# Patient Record
Sex: FEMALE | Race: WHITE | NOT HISPANIC OR LATINO | Employment: FULL TIME | URBAN - METROPOLITAN AREA
[De-identification: names, ages, dates, MRNs, and addresses within clinical notes are randomized per-mention and may not be internally consistent; named-entity substitution may affect disease eponyms.]

---

## 2023-09-29 LAB
ALANINE AMINOTRANSFERASE (SGPT) (U/L) IN SER/PLAS: 15 U/L (ref 7–45)
ALBUMIN (G/DL) IN SER/PLAS: 4.2 G/DL (ref 3.4–5)
ALKALINE PHOSPHATASE (U/L) IN SER/PLAS: 113 U/L (ref 33–136)
ANION GAP IN SER/PLAS: 13 MMOL/L (ref 10–20)
ASPARTATE AMINOTRANSFERASE (SGOT) (U/L) IN SER/PLAS: 15 U/L (ref 9–39)
BILIRUBIN TOTAL (MG/DL) IN SER/PLAS: 0.3 MG/DL (ref 0–1.2)
CALCIDIOL (25 OH VITAMIN D3) (NG/ML) IN SER/PLAS: 53 NG/ML
CALCIUM (MG/DL) IN SER/PLAS: 9.9 MG/DL (ref 8.6–10.3)
CARBON DIOXIDE, TOTAL (MMOL/L) IN SER/PLAS: 22 MMOL/L (ref 21–32)
CHLORIDE (MMOL/L) IN SER/PLAS: 105 MMOL/L (ref 98–107)
CHOLESTEROL (MG/DL) IN SER/PLAS: 154 MG/DL (ref 0–199)
CHOLESTEROL IN HDL (MG/DL) IN SER/PLAS: 32.4 MG/DL
CHOLESTEROL/HDL RATIO: 4.8
COBALAMIN (VITAMIN B12) (PG/ML) IN SER/PLAS: 683 PG/ML (ref 211–911)
CREATININE (MG/DL) IN SER/PLAS: 0.64 MG/DL (ref 0.5–1.05)
ESTIMATED AVERAGE GLUCOSE FOR HBA1C: 169 MG/DL
GFR FEMALE: >90 ML/MIN/1.73M2
GLUCOSE (MG/DL) IN SER/PLAS: 135 MG/DL (ref 74–99)
HEMOGLOBIN A1C/HEMOGLOBIN TOTAL IN BLOOD: 7.5 %
LDL: 58 MG/DL (ref 0–99)
NON HDL CHOLESTEROL: 122 MG/DL
POTASSIUM (MMOL/L) IN SER/PLAS: 4.2 MMOL/L (ref 3.5–5.3)
PROTEIN TOTAL: 7 G/DL (ref 6.4–8.2)
SODIUM (MMOL/L) IN SER/PLAS: 136 MMOL/L (ref 136–145)
THYROTROPIN (MIU/L) IN SER/PLAS BY DETECTION LIMIT <= 0.05 MIU/L: 2.16 MIU/L (ref 0.44–3.98)
TRIGLYCERIDE (MG/DL) IN SER/PLAS: 317 MG/DL (ref 0–149)
UREA NITROGEN (MG/DL) IN SER/PLAS: 28 MG/DL (ref 6–23)
VLDL: 63 MG/DL (ref 0–40)

## 2023-10-13 ENCOUNTER — OFFICE VISIT (OUTPATIENT)
Dept: PRIMARY CARE | Facility: CLINIC | Age: 63
End: 2023-10-13
Payer: COMMERCIAL

## 2023-10-13 VITALS
WEIGHT: 160 LBS | HEIGHT: 62 IN | SYSTOLIC BLOOD PRESSURE: 152 MMHG | HEART RATE: 70 BPM | BODY MASS INDEX: 29.44 KG/M2 | TEMPERATURE: 97.9 F | DIASTOLIC BLOOD PRESSURE: 90 MMHG

## 2023-10-13 DIAGNOSIS — I10 PRIMARY HYPERTENSION: Primary | ICD-10-CM

## 2023-10-13 DIAGNOSIS — F41.9 ANXIETY DISORDER, UNSPECIFIED TYPE: ICD-10-CM

## 2023-10-13 DIAGNOSIS — E78.5 HYPERLIPIDEMIA, UNSPECIFIED HYPERLIPIDEMIA TYPE: ICD-10-CM

## 2023-10-13 DIAGNOSIS — E11.9 TYPE 2 DIABETES MELLITUS WITHOUT COMPLICATION, WITHOUT LONG-TERM CURRENT USE OF INSULIN (MULTI): ICD-10-CM

## 2023-10-13 PROBLEM — E53.8 VITAMIN B12 DEFICIENCY: Status: ACTIVE | Noted: 2023-10-13

## 2023-10-13 PROBLEM — M20.10 HALLUX VALGUS: Status: ACTIVE | Noted: 2023-10-13

## 2023-10-13 PROBLEM — E66.9 OBESITY (BMI 30.0-34.9): Status: ACTIVE | Noted: 2023-10-13

## 2023-10-13 PROBLEM — E55.9 VITAMIN D DEFICIENCY: Status: ACTIVE | Noted: 2023-10-13

## 2023-10-13 PROBLEM — D17.9 LIPOMA: Status: ACTIVE | Noted: 2023-10-13

## 2023-10-13 PROBLEM — E66.811 OBESITY (BMI 30.0-34.9): Status: ACTIVE | Noted: 2023-10-13

## 2023-10-13 PROCEDURE — 3077F SYST BP >= 140 MM HG: CPT | Performed by: FAMILY MEDICINE

## 2023-10-13 PROCEDURE — 99214 OFFICE O/P EST MOD 30 MIN: CPT | Performed by: FAMILY MEDICINE

## 2023-10-13 PROCEDURE — 3080F DIAST BP >= 90 MM HG: CPT | Performed by: FAMILY MEDICINE

## 2023-10-13 PROCEDURE — 1036F TOBACCO NON-USER: CPT | Performed by: FAMILY MEDICINE

## 2023-10-13 PROCEDURE — 3051F HG A1C>EQUAL 7.0%<8.0%: CPT | Performed by: FAMILY MEDICINE

## 2023-10-13 RX ORDER — BLOOD SUGAR DIAGNOSTIC
STRIP MISCELLANEOUS
COMMUNITY
Start: 2014-05-30

## 2023-10-13 RX ORDER — ATORVASTATIN CALCIUM 40 MG/1
40 TABLET, FILM COATED ORAL DAILY
Qty: 90 TABLET | Refills: 0 | Status: SHIPPED | OUTPATIENT
Start: 2023-10-13 | End: 2024-02-15

## 2023-10-13 RX ORDER — ERGOCALCIFEROL 1.25 MG/1
1 CAPSULE ORAL
COMMUNITY
Start: 2021-12-09 | End: 2024-02-29 | Stop reason: WASHOUT

## 2023-10-13 RX ORDER — LANOLIN ALCOHOL/MO/W.PET/CERES
1 CREAM (GRAM) TOPICAL DAILY
COMMUNITY

## 2023-10-13 RX ORDER — ACETAMINOPHEN 500 MG
TABLET ORAL
COMMUNITY

## 2023-10-13 RX ORDER — SERTRALINE HYDROCHLORIDE 50 MG/1
1 TABLET, FILM COATED ORAL DAILY
COMMUNITY
Start: 2021-10-04 | End: 2023-10-13 | Stop reason: ALTCHOICE

## 2023-10-13 RX ORDER — ATORVASTATIN CALCIUM 40 MG/1
1 TABLET, FILM COATED ORAL DAILY
COMMUNITY
Start: 2014-05-23 | End: 2023-10-13 | Stop reason: SDUPTHER

## 2023-10-13 RX ORDER — BENAZEPRIL HYDROCHLORIDE AND HYDROCHLOROTHIAZIDE 10; 12.5 MG/1; MG/1
TABLET ORAL
COMMUNITY
Start: 2021-03-30 | End: 2023-10-13 | Stop reason: SDUPTHER

## 2023-10-13 RX ORDER — METFORMIN HYDROCHLORIDE 500 MG/1
500 TABLET ORAL
Qty: 180 TABLET | Refills: 0 | Status: SHIPPED | OUTPATIENT
Start: 2023-10-13 | End: 2024-02-29 | Stop reason: SDUPTHER

## 2023-10-13 RX ORDER — EMPAGLIFLOZIN 10 MG/1
1 TABLET, FILM COATED ORAL DAILY
COMMUNITY
Start: 2022-09-02 | End: 2023-10-13 | Stop reason: ALTCHOICE

## 2023-10-13 RX ORDER — EMPAGLIFLOZIN 25 MG/1
25 TABLET, FILM COATED ORAL
COMMUNITY
Start: 2023-03-09 | End: 2023-10-13 | Stop reason: SDUPTHER

## 2023-10-13 RX ORDER — NYSTATIN AND TRIAMCINOLONE ACETONIDE 100000; 1 [USP'U]/G; MG/G
1 OINTMENT TOPICAL 2 TIMES DAILY
COMMUNITY
Start: 2022-05-27

## 2023-10-13 RX ORDER — EMPAGLIFLOZIN 25 MG/1
25 TABLET, FILM COATED ORAL DAILY
Qty: 90 TABLET | Refills: 0 | Status: SHIPPED | OUTPATIENT
Start: 2023-10-13 | End: 2024-02-29 | Stop reason: SDUPTHER

## 2023-10-13 RX ORDER — MECOBALAMIN 1000 MCG
TABLET,CHEWABLE ORAL
COMMUNITY
End: 2024-02-29 | Stop reason: WASHOUT

## 2023-10-13 RX ORDER — BLOOD SUGAR DIAGNOSTIC
STRIP MISCELLANEOUS
COMMUNITY
Start: 2011-02-16 | End: 2024-04-03 | Stop reason: SDUPTHER

## 2023-10-13 RX ORDER — ASPIRIN 81 MG/1
81 TABLET ORAL DAILY
COMMUNITY

## 2023-10-13 RX ORDER — METFORMIN HYDROCHLORIDE 500 MG/1
2 TABLET ORAL 2 TIMES DAILY
COMMUNITY
Start: 2021-12-01 | End: 2023-10-13 | Stop reason: SDUPTHER

## 2023-10-13 RX ORDER — BENAZEPRIL HYDROCHLORIDE AND HYDROCHLOROTHIAZIDE 10; 12.5 MG/1; MG/1
1 TABLET ORAL DAILY
Qty: 90 TABLET | Refills: 0 | Status: SHIPPED | OUTPATIENT
Start: 2023-10-13 | End: 2024-01-11

## 2023-10-13 RX ORDER — GLIMEPIRIDE 1 MG/1
TABLET ORAL
COMMUNITY
Start: 2022-04-17 | End: 2023-10-13 | Stop reason: ALTCHOICE

## 2023-10-13 RX ORDER — EPINEPHRINE 0.22MG
AEROSOL WITH ADAPTER (ML) INHALATION
COMMUNITY

## 2023-10-13 RX ORDER — SERTRALINE HYDROCHLORIDE 50 MG/1
50 TABLET, FILM COATED ORAL EVERY 24 HOURS
Qty: 90 TABLET | Refills: 0 | Status: SHIPPED | OUTPATIENT
Start: 2023-10-13 | End: 2024-02-29 | Stop reason: SDUPTHER

## 2023-10-13 RX ORDER — ATORVASTATIN CALCIUM 40 MG/1
TABLET, FILM COATED ORAL
COMMUNITY
Start: 2011-03-15 | End: 2023-10-13 | Stop reason: ALTCHOICE

## 2023-10-13 RX ORDER — SERTRALINE HYDROCHLORIDE 50 MG/1
TABLET, FILM COATED ORAL EVERY 24 HOURS
COMMUNITY
Start: 2021-10-04 | End: 2023-10-13 | Stop reason: ALTCHOICE

## 2023-10-13 NOTE — PROGRESS NOTES
"    /90 (BP Location: Right arm, Patient Position: Sitting, BP Cuff Size: Adult)   Pulse 70   Temp 36.6 °C (97.9 °F) (Skin)   Ht 1.575 m (5' 2\")   Wt 72.6 kg (160 lb)   BMI 29.26 kg/m²     Past Medical History:   Diagnosis Date    Acute upper respiratory infection, unspecified 12/06/2021    Upper respiratory infection, acute    Body mass index (BMI)30.0-30.9, adult     Body mass index (BMI) of 30.0-30.9 in adult    Contact with and (suspected) exposure to covid-19 12/06/2021    Exposure to COVID-19 virus    COVID-19 12/09/2021    COVID    Elevated blood-pressure reading, without diagnosis of hypertension     Elevated blood pressure reading without diagnosis of hypertension    Localized swelling, mass and lump, unspecified upper limb     Lump of axilla    Other conditions influencing health status     Type 2 diabetes mellitus, uncontrolled    Pain in right knee     Acute pain of right knee    Personal history of other benign neoplasm     History of lipoma       Patient Active Problem List   Diagnosis    Anxiety disorder    Hallux valgus    Hyperlipidemia    Lipoma    Obesity (BMI 30.0-34.9)    Primary hypertension    Type 2 diabetes mellitus (CMS/HCC)    Vitamin B12 deficiency    Vitamin D deficiency       Current Outpatient Medications   Medication Sig Dispense Refill    atorvastatin (Lipitor) 40 mg tablet       benazepriL-hydrochlorothiazide (Lotensin HCT) 10-12.5 mg tablet       cholecalciferol (Vitamin D-3) 5,000 Units tablet       coenzyme Q-10 100 mg capsule       cyanocobalamin (Vitamin B-12) 1,000 mcg tablet Take 1 tablet (1,000 mcg) by mouth once daily.      Jardiance 10 mg Take 1 tablet (10 mg) by mouth once daily.      metFORMIN (Glucophage) 500 mg tablet Take 2 tablets (1,000 mg) by mouth 2 times a day. Two at Breakfast and Two at Dinner      nystatin-triamcinolone (Mycolog II) ointment Apply 1 Application topically twice a day.      OneTouch Ultra Test strip once daily.      sertraline " (Zoloft) 50 mg tablet Take by mouth once every 24 hours.      aspirin 81 mg EC tablet Take 1 tablet (81 mg) by mouth once daily.      atorvastatin (Lipitor) 40 mg tablet Take 1 tablet (40 mg) by mouth once daily.      empagliflozin (Jardiance) 10 mg Take 1 tablet (10 mg) by mouth once daily with a meal.      ergocalciferol (Vitamin D-2) 1.25 MG (86030 UT) capsule Take 1 capsule (1,250 mcg) by mouth 1 (one) time per week.      glimepiride (Amaryl) 1 mg tablet       Jardiance 25 mg 1 tablet (25 mg).      mecobalamin, vitamin B12, 1,000 mcg tablet,chewable       OneTouch Ultra Test strip       sertraline (Zoloft) 50 mg tablet Take 1 tablet (50 mg) by mouth once daily.       No current facility-administered medications for this visit.       CC/HPI/ASSESSMENT/PLAN    CC follow-up medication    HPI patient suffers from hypertension hyperlipidemia diabetes anxiety disorder.  Recent blood work performed reviewed.  A1c 7.5.  Cholesterol increased slightly.  Liver kidney function normal.  Thyroid level good.  All results reviewed.  Blood pressure mildly elevated.  Patient notes she is dealing with a lot of stress.  She remains on Zoloft 50 mg daily.  Denies chest pain short of breath abdominal pain.  ROS negative except noted above past medical social history reviewed.  Denies side effect medications    Exam calm blood pressure rechecked 146/82 neck supple no carotid bruit lungs CTA CV RRR psych calm pleasant female no psychosis    A/P 1.  Hypertension mildly uncontrolled.  Continue benazepril as ordered follow-up 3 months.  #2 hyperlipidemia chronic stable meds refilled.  #3 type 2 diabetes uncontrolled.  Increase Jardiance to 25 mg daily.  Follow-up 3 months to check A1c in office.  #4 anxiety chronic stable medicine refilled follow-up 3 months    There are no diagnoses linked to this encounter.

## 2024-02-15 DIAGNOSIS — E78.5 HYPERLIPIDEMIA, UNSPECIFIED HYPERLIPIDEMIA TYPE: ICD-10-CM

## 2024-02-15 RX ORDER — ATORVASTATIN CALCIUM 40 MG/1
40 TABLET, FILM COATED ORAL DAILY
Qty: 90 TABLET | Refills: 1 | Status: SHIPPED | OUTPATIENT
Start: 2024-02-15 | End: 2024-02-29 | Stop reason: SDUPTHER

## 2024-02-29 ENCOUNTER — OFFICE VISIT (OUTPATIENT)
Dept: PRIMARY CARE | Facility: CLINIC | Age: 64
End: 2024-02-29
Payer: COMMERCIAL

## 2024-02-29 VITALS
HEIGHT: 62 IN | BODY MASS INDEX: 28.71 KG/M2 | DIASTOLIC BLOOD PRESSURE: 60 MMHG | WEIGHT: 156 LBS | SYSTOLIC BLOOD PRESSURE: 120 MMHG | HEART RATE: 70 BPM

## 2024-02-29 DIAGNOSIS — E11.9 TYPE 2 DIABETES MELLITUS WITHOUT COMPLICATION, WITHOUT LONG-TERM CURRENT USE OF INSULIN (MULTI): Primary | ICD-10-CM

## 2024-02-29 DIAGNOSIS — E55.9 VITAMIN D DEFICIENCY: ICD-10-CM

## 2024-02-29 DIAGNOSIS — E78.5 HYPERLIPIDEMIA, UNSPECIFIED HYPERLIPIDEMIA TYPE: ICD-10-CM

## 2024-02-29 DIAGNOSIS — F41.9 ANXIETY DISORDER, UNSPECIFIED TYPE: ICD-10-CM

## 2024-02-29 DIAGNOSIS — I10 PRIMARY HYPERTENSION: ICD-10-CM

## 2024-02-29 DIAGNOSIS — E53.8 VITAMIN B12 DEFICIENCY: ICD-10-CM

## 2024-02-29 LAB — POC HEMOGLOBIN A1C: 7.2 % (ref 4.2–6.5)

## 2024-02-29 PROCEDURE — 83036 HEMOGLOBIN GLYCOSYLATED A1C: CPT | Performed by: FAMILY MEDICINE

## 2024-02-29 PROCEDURE — 1036F TOBACCO NON-USER: CPT | Performed by: FAMILY MEDICINE

## 2024-02-29 PROCEDURE — 3074F SYST BP LT 130 MM HG: CPT | Performed by: FAMILY MEDICINE

## 2024-02-29 PROCEDURE — 99214 OFFICE O/P EST MOD 30 MIN: CPT | Performed by: FAMILY MEDICINE

## 2024-02-29 PROCEDURE — 3078F DIAST BP <80 MM HG: CPT | Performed by: FAMILY MEDICINE

## 2024-02-29 RX ORDER — BENAZEPRIL HYDROCHLORIDE AND HYDROCHLOROTHIAZIDE 10; 12.5 MG/1; MG/1
1 TABLET ORAL DAILY
Qty: 90 TABLET | Refills: 1 | Status: SHIPPED | OUTPATIENT
Start: 2024-02-29

## 2024-02-29 RX ORDER — METFORMIN HYDROCHLORIDE 500 MG/1
500 TABLET ORAL
Qty: 180 TABLET | Refills: 1 | Status: SHIPPED | OUTPATIENT
Start: 2024-02-29 | End: 2024-03-13 | Stop reason: SDUPTHER

## 2024-02-29 RX ORDER — EMPAGLIFLOZIN 25 MG/1
25 TABLET, FILM COATED ORAL DAILY
Qty: 90 TABLET | Refills: 1 | Status: SHIPPED | OUTPATIENT
Start: 2024-02-29 | End: 2025-02-28

## 2024-02-29 RX ORDER — SERTRALINE HYDROCHLORIDE 50 MG/1
50 TABLET, FILM COATED ORAL EVERY 24 HOURS
Qty: 90 TABLET | Refills: 1 | Status: SHIPPED | OUTPATIENT
Start: 2024-02-29 | End: 2025-02-28

## 2024-02-29 RX ORDER — ATORVASTATIN CALCIUM 40 MG/1
40 TABLET, FILM COATED ORAL DAILY
Qty: 90 TABLET | Refills: 1 | Status: SHIPPED | OUTPATIENT
Start: 2024-02-29

## 2024-02-29 ASSESSMENT — PATIENT HEALTH QUESTIONNAIRE - PHQ9
2. FEELING DOWN, DEPRESSED OR HOPELESS: NOT AT ALL
1. LITTLE INTEREST OR PLEASURE IN DOING THINGS: NOT AT ALL
SUM OF ALL RESPONSES TO PHQ9 QUESTIONS 1 AND 2: 0

## 2024-02-29 NOTE — PROGRESS NOTES
"    /60   Pulse 70   Ht 1.575 m (5' 2\")   Wt 70.8 kg (156 lb)   BMI 28.53 kg/m²     Past Medical History:   Diagnosis Date    Acute upper respiratory infection, unspecified 12/06/2021    Upper respiratory infection, acute    Anxiety     Body mass index (BMI)30.0-30.9, adult     Body mass index (BMI) of 30.0-30.9 in adult    Contact with and (suspected) exposure to covid-19 12/06/2021    Exposure to COVID-19 virus    COVID-19 12/09/2021    COVID    Diabetes mellitus (CMS/HCC)     Elevated blood-pressure reading, without diagnosis of hypertension     Elevated blood pressure reading without diagnosis of hypertension    Hypertension     Localized swelling, mass and lump, unspecified upper limb     Lump of axilla    Other conditions influencing health status     Type 2 diabetes mellitus, uncontrolled    Pain in right knee     Acute pain of right knee    Personal history of other benign neoplasm     History of lipoma       Patient Active Problem List   Diagnosis    Anxiety disorder    Hallux valgus    Hyperlipidemia    Lipoma    Obesity (BMI 30.0-34.9)    Primary hypertension    Type 2 diabetes mellitus (CMS/HCC)    Vitamin B12 deficiency    Vitamin D deficiency       Current Outpatient Medications   Medication Sig Dispense Refill    aspirin 81 mg EC tablet Take 1 tablet (81 mg) by mouth once daily.      atorvastatin (Lipitor) 40 mg tablet TAKE 1 TABLET DAILY 90 tablet 1    benazepriL-hydrochlorothiazide (Lotensin HCT) 10-12.5 mg tablet TAKE 1 TABLET DAILY 90 tablet 0    cholecalciferol (Vitamin D-3) 5,000 Units tablet       coenzyme Q-10 100 mg capsule       cyanocobalamin (Vitamin B-12) 1,000 mcg tablet Take 1 tablet (1,000 mcg) by mouth once daily.      Jardiance 25 mg Take 1 tablet (25 mg) by mouth once daily. 90 tablet 0    metFORMIN (Glucophage) 500 mg tablet Take 1 tablet (500 mg) by mouth 2 times a day with meals. Two at Breakfast and Two at Dinner 180 tablet 0    nystatin-triamcinolone (Mycolog II) " ointment Apply 1 Application topically twice a day.      OneTouch Ultra Test strip       OneTouch Ultra Test strip once daily.      sertraline (Zoloft) 50 mg tablet Take 1 tablet (50 mg) by mouth once every 24 hours. 90 tablet 0     No current facility-administered medications for this visit.       CC/HPI/ASSESSMENT/PLAN    CC follow-up medication    HPI patient 63-year-old female history of diabetes anxiety disorder hypertension hyperlipidemia vitamin deficiencies.  Will check A1c in office, current A1c is 7.2, this is improved from 7.5.  Her blood pressure is under excellent control as well.  She denies side effect of medication.  She request refills for medication.  She will need blood work prior to next visit.  Medication list reviewed.  She is doing well with Zoloft 50 mg for her anxiety requesting refills.  Remains on B12 supplement and 5000 IU vitamin D3 daily.  She recently completed her shingle vaccine series.  ROS negative except note above past medical social history reviewed    Exam calm vital stable eyes no jaundice neck supple lungs CTA CV RRR neuro alert and oriented CN II through XII intact no focal neurologic deficits noted psych calm pleasant female no psychosis    A/P 1.  Type 2 diabetes stable A1c 7.2 meds refilled blood work ordered.  #2 anxiety disorder chronic stable 3 hypertension chronic stable 4 hyperlipidemia chronic stable 5 B12 deficiency 6 vitamin D deficiency.  Extensive blood work is ordered.  All medications were refilled.  Follow-up 6 months    There are no diagnoses linked to this encounter.

## 2024-03-12 ENCOUNTER — TELEPHONE (OUTPATIENT)
Dept: PRIMARY CARE | Facility: CLINIC | Age: 64
End: 2024-03-12
Payer: COMMERCIAL

## 2024-03-12 NOTE — TELEPHONE ENCOUNTER
The pharmacist is requesting clarification on pt Metformin 500 mg. They stated that there is a conflict ion of directions.   Please Advise.     Phone: [phone]

## 2024-03-13 ENCOUNTER — TELEPHONE (OUTPATIENT)
Dept: PRIMARY CARE | Facility: CLINIC | Age: 64
End: 2024-03-13
Payer: COMMERCIAL

## 2024-03-13 DIAGNOSIS — E11.9 TYPE 2 DIABETES MELLITUS WITHOUT COMPLICATION, WITHOUT LONG-TERM CURRENT USE OF INSULIN (MULTI): ICD-10-CM

## 2024-03-13 RX ORDER — METFORMIN HYDROCHLORIDE 500 MG/1
1000 TABLET ORAL
Qty: 360 TABLET | Refills: 1 | Status: SHIPPED | OUTPATIENT
Start: 2024-03-13 | End: 2025-03-13

## 2024-04-03 DIAGNOSIS — E11.9 TYPE 2 DIABETES MELLITUS WITHOUT COMPLICATION, WITHOUT LONG-TERM CURRENT USE OF INSULIN (MULTI): Primary | ICD-10-CM

## 2024-04-03 RX ORDER — BLOOD SUGAR DIAGNOSTIC
STRIP MISCELLANEOUS
Qty: 100 STRIP | Refills: 3 | Status: SHIPPED | OUTPATIENT
Start: 2024-04-03

## 2024-04-04 RX ORDER — BLOOD SUGAR DIAGNOSTIC
1 STRIP MISCELLANEOUS DAILY
Qty: 300 STRIP | Refills: 3 | OUTPATIENT
Start: 2024-04-04

## 2024-04-05 ENCOUNTER — TELEPHONE (OUTPATIENT)
Dept: PRIMARY CARE | Facility: CLINIC | Age: 64
End: 2024-04-05
Payer: COMMERCIAL

## 2024-04-05 DIAGNOSIS — E11.9 TYPE 2 DIABETES MELLITUS WITHOUT COMPLICATION, WITHOUT LONG-TERM CURRENT USE OF INSULIN (MULTI): Primary | ICD-10-CM

## 2024-04-05 RX ORDER — LANCETS
1 EACH MISCELLANEOUS DAILY
Qty: 100 EACH | Refills: 3 | Status: SHIPPED | OUTPATIENT
Start: 2024-04-05

## 2024-08-05 ENCOUNTER — LAB (OUTPATIENT)
Dept: LAB | Facility: LAB | Age: 64
End: 2024-08-05
Payer: COMMERCIAL

## 2024-08-05 DIAGNOSIS — I10 PRIMARY HYPERTENSION: ICD-10-CM

## 2024-08-05 DIAGNOSIS — E53.8 VITAMIN B12 DEFICIENCY: ICD-10-CM

## 2024-08-05 DIAGNOSIS — E11.9 TYPE 2 DIABETES MELLITUS WITHOUT COMPLICATION, WITHOUT LONG-TERM CURRENT USE OF INSULIN (MULTI): ICD-10-CM

## 2024-08-05 DIAGNOSIS — E55.9 VITAMIN D DEFICIENCY: ICD-10-CM

## 2024-08-05 LAB
25(OH)D3 SERPL-MCNC: 49 NG/ML (ref 30–100)
ALBUMIN SERPL BCP-MCNC: 4.1 G/DL (ref 3.4–5)
ALP SERPL-CCNC: 97 U/L (ref 33–136)
ALT SERPL W P-5'-P-CCNC: 14 U/L (ref 7–45)
ANION GAP SERPL CALC-SCNC: 14 MMOL/L (ref 10–20)
AST SERPL W P-5'-P-CCNC: 12 U/L (ref 9–39)
BILIRUB SERPL-MCNC: 0.4 MG/DL (ref 0–1.2)
BUN SERPL-MCNC: 24 MG/DL (ref 6–23)
CALCIUM SERPL-MCNC: 9.7 MG/DL (ref 8.6–10.3)
CHLORIDE SERPL-SCNC: 107 MMOL/L (ref 98–107)
CHOLEST SERPL-MCNC: 159 MG/DL (ref 0–199)
CHOLESTEROL/HDL RATIO: 4.3
CO2 SERPL-SCNC: 23 MMOL/L (ref 21–32)
CREAT SERPL-MCNC: 0.71 MG/DL (ref 0.5–1.05)
EGFRCR SERPLBLD CKD-EPI 2021: >90 ML/MIN/1.73M*2
EST. AVERAGE GLUCOSE BLD GHB EST-MCNC: 171 MG/DL
GLUCOSE SERPL-MCNC: 143 MG/DL (ref 74–99)
HBA1C MFR BLD: 7.6 %
HDLC SERPL-MCNC: 36.8 MG/DL
LDLC SERPL CALC-MCNC: 94 MG/DL
NON HDL CHOLESTEROL: 122 MG/DL (ref 0–149)
POTASSIUM SERPL-SCNC: 4.4 MMOL/L (ref 3.5–5.3)
PROT SERPL-MCNC: 6.6 G/DL (ref 6.4–8.2)
SODIUM SERPL-SCNC: 140 MMOL/L (ref 136–145)
TRIGL SERPL-MCNC: 143 MG/DL (ref 0–149)
TSH SERPL-ACNC: 2.04 MIU/L (ref 0.44–3.98)
VIT B12 SERPL-MCNC: 578 PG/ML (ref 211–911)
VLDL: 29 MG/DL (ref 0–40)

## 2024-08-05 PROCEDURE — 36415 COLL VENOUS BLD VENIPUNCTURE: CPT

## 2024-08-05 PROCEDURE — 80053 COMPREHEN METABOLIC PANEL: CPT

## 2024-08-05 PROCEDURE — 82607 VITAMIN B-12: CPT

## 2024-08-05 PROCEDURE — 80061 LIPID PANEL: CPT

## 2024-08-05 PROCEDURE — 84443 ASSAY THYROID STIM HORMONE: CPT

## 2024-08-05 PROCEDURE — 82306 VITAMIN D 25 HYDROXY: CPT

## 2024-08-05 PROCEDURE — 83036 HEMOGLOBIN GLYCOSYLATED A1C: CPT

## 2024-08-12 ENCOUNTER — APPOINTMENT (OUTPATIENT)
Dept: PRIMARY CARE | Facility: CLINIC | Age: 64
End: 2024-08-12
Payer: COMMERCIAL

## 2024-08-12 VITALS
WEIGHT: 162.2 LBS | DIASTOLIC BLOOD PRESSURE: 72 MMHG | HEART RATE: 78 BPM | BODY MASS INDEX: 29.85 KG/M2 | TEMPERATURE: 97.4 F | HEIGHT: 62 IN | SYSTOLIC BLOOD PRESSURE: 116 MMHG

## 2024-08-12 DIAGNOSIS — I10 PRIMARY HYPERTENSION: ICD-10-CM

## 2024-08-12 DIAGNOSIS — F41.9 ANXIETY DISORDER, UNSPECIFIED TYPE: ICD-10-CM

## 2024-08-12 DIAGNOSIS — E78.5 HYPERLIPIDEMIA, UNSPECIFIED HYPERLIPIDEMIA TYPE: ICD-10-CM

## 2024-08-12 DIAGNOSIS — E55.9 VITAMIN D DEFICIENCY: ICD-10-CM

## 2024-08-12 DIAGNOSIS — E11.9 TYPE 2 DIABETES MELLITUS WITHOUT COMPLICATION, WITHOUT LONG-TERM CURRENT USE OF INSULIN (MULTI): Primary | ICD-10-CM

## 2024-08-12 DIAGNOSIS — E53.8 VITAMIN B12 DEFICIENCY: ICD-10-CM

## 2024-08-12 PROBLEM — R03.0 ELEVATED BLOOD PRESSURE READING WITHOUT DIAGNOSIS OF HYPERTENSION: Status: ACTIVE | Noted: 2024-08-12

## 2024-08-12 PROBLEM — M25.569 KNEE PAIN: Status: ACTIVE | Noted: 2024-08-12

## 2024-08-12 PROBLEM — Z86.018 HISTORY OF LIPOMA: Status: ACTIVE | Noted: 2024-08-12

## 2024-08-12 PROBLEM — R22.30 AXILLARY MASS: Status: ACTIVE | Noted: 2024-08-12

## 2024-08-12 PROCEDURE — 1036F TOBACCO NON-USER: CPT | Performed by: FAMILY MEDICINE

## 2024-08-12 PROCEDURE — 3048F LDL-C <100 MG/DL: CPT | Performed by: FAMILY MEDICINE

## 2024-08-12 PROCEDURE — 3078F DIAST BP <80 MM HG: CPT | Performed by: FAMILY MEDICINE

## 2024-08-12 PROCEDURE — 3074F SYST BP LT 130 MM HG: CPT | Performed by: FAMILY MEDICINE

## 2024-08-12 PROCEDURE — 3008F BODY MASS INDEX DOCD: CPT | Performed by: FAMILY MEDICINE

## 2024-08-12 PROCEDURE — 99214 OFFICE O/P EST MOD 30 MIN: CPT | Performed by: FAMILY MEDICINE

## 2024-08-12 PROCEDURE — 3051F HG A1C>EQUAL 7.0%<8.0%: CPT | Performed by: FAMILY MEDICINE

## 2024-08-12 RX ORDER — METFORMIN HYDROCHLORIDE 500 MG/1
1000 TABLET ORAL
Qty: 360 TABLET | Refills: 1 | Status: SHIPPED | OUTPATIENT
Start: 2024-08-12 | End: 2025-08-12

## 2024-08-12 RX ORDER — ATORVASTATIN CALCIUM 40 MG/1
40 TABLET, FILM COATED ORAL DAILY
Qty: 90 TABLET | Refills: 1 | Status: SHIPPED | OUTPATIENT
Start: 2024-08-12 | End: 2024-08-14

## 2024-08-12 RX ORDER — EMPAGLIFLOZIN 25 MG/1
25 TABLET, FILM COATED ORAL DAILY
Qty: 90 TABLET | Refills: 1 | Status: SHIPPED | OUTPATIENT
Start: 2024-08-12 | End: 2025-08-12

## 2024-08-12 RX ORDER — BENAZEPRIL HYDROCHLORIDE AND HYDROCHLOROTHIAZIDE 10; 12.5 MG/1; MG/1
1 TABLET ORAL DAILY
Qty: 90 TABLET | Refills: 1 | Status: SHIPPED | OUTPATIENT
Start: 2024-08-12

## 2024-08-12 RX ORDER — SERTRALINE HYDROCHLORIDE 50 MG/1
50 TABLET, FILM COATED ORAL EVERY 24 HOURS
Qty: 90 TABLET | Refills: 1 | Status: SHIPPED | OUTPATIENT
Start: 2024-08-12 | End: 2025-08-12

## 2024-08-12 ASSESSMENT — PATIENT HEALTH QUESTIONNAIRE - PHQ9
SUM OF ALL RESPONSES TO PHQ9 QUESTIONS 1 AND 2: 0
1. LITTLE INTEREST OR PLEASURE IN DOING THINGS: NOT AT ALL
2. FEELING DOWN, DEPRESSED OR HOPELESS: NOT AT ALL

## 2024-08-12 NOTE — PROGRESS NOTES
"    /72   Pulse 78   Temp 36.3 °C (97.4 °F)   Ht 1.575 m (5' 2\")   Wt 73.6 kg (162 lb 3.2 oz)   BMI 29.67 kg/m²     Past Medical History:   Diagnosis Date    Acute upper respiratory infection, unspecified 12/06/2021    Upper respiratory infection, acute    Anxiety     Body mass index (BMI)30.0-30.9, adult     Body mass index (BMI) of 30.0-30.9 in adult    Contact with and (suspected) exposure to covid-19 12/06/2021    Exposure to COVID-19 virus    COVID-19 12/09/2021    COVID    Diabetes mellitus (Multi)     Elevated blood-pressure reading, without diagnosis of hypertension     Elevated blood pressure reading without diagnosis of hypertension    Hypertension     Localized swelling, mass and lump, unspecified upper limb     Lump of axilla    Other conditions influencing health status     Type 2 diabetes mellitus, uncontrolled    Pain in right knee     Acute pain of right knee    Personal history of other benign neoplasm     History of lipoma       Patient Active Problem List   Diagnosis    Anxiety disorder    Hallux valgus    Hyperlipidemia    Lipoma    Class 1 obesity    Primary hypertension    Type 2 diabetes mellitus (Multi)    Cobalamin deficiency    Vitamin D deficiency    Axillary mass    Elevated blood pressure reading without diagnosis of hypertension    History of lipoma    Knee pain       Current Outpatient Medications   Medication Sig Dispense Refill    aspirin 81 mg EC tablet Take 1 tablet (81 mg) by mouth once daily.      atorvastatin (Lipitor) 40 mg tablet Take 1 tablet (40 mg) by mouth once daily. 90 tablet 1    benazepriL-hydrochlorothiazide (Lotensin HCT) 10-12.5 mg tablet Take 1 tablet by mouth once daily. 90 tablet 1    cholecalciferol (Vitamin D-3) 5,000 Units tablet       coenzyme Q-10 100 mg capsule       cyanocobalamin (Vitamin B-12) 1,000 mcg tablet Take 1 tablet (1,000 mcg) by mouth once daily.      Jardiance 25 mg Take 1 tablet (25 mg) by mouth once daily. 90 tablet 1    lancets " misc 1 each once daily. 100 each 3    metFORMIN (Glucophage) 500 mg tablet Take 2 tablets (1,000 mg) by mouth 2 times a day with meals. 360 tablet 1    nystatin-triamcinolone (Mycolog II) ointment Apply 1 Application topically twice a day.      OneTouch Ultra Test strip once daily.      sertraline (Zoloft) 50 mg tablet Take 1 tablet (50 mg) by mouth once every 24 hours. 90 tablet 1    OneTouch Ultra Test strip Test blood sugar daily e11.9 (Patient not taking: Reported on 8/12/2024) 100 strip 3     No current facility-administered medications for this visit.       CC/HPI/ASSESSMENT/PLAN    CC follow medication    HPI patient with a history of hypertension hyperlipidemia diabetes, requesting refills for medication.  She had blood work performed recently.  Her A1c is 7.6.  Cholesterol level under reasonable control.  Liver kidney function normal thyroid vitamin D vitamin B12 levels normal.  All results were reviewed.  She is doing well with Zoloft 50 mg daily.  She request refills for medications.  Patient denies headache fever chest pain palpitation short of breath.  We discussed dietary changes for weight loss purposes as well as improving her blood sugar control.  ROS negative except noted above.  Past medical social surgical history is reviewed    Exam calm eyes no jaundice neck supple no LAD no carotid bruit lungs CTA CV RRR Ext no edema skin no rash psych calm pleasant female no psychosis    A/P 1.  Type 2 diabetes mildly uncontrolled A1c 7.6.  Medicine refilled.  Blood work ordered.  Dietary changes discussed.  2 hyperlipidemia chronic stable 3 hypertension chronic stable 4 vitamin D deficiency stable 5 anxiety disorder chronic stable.  Medicines are refilled.  Blood work is ordered.  Follow-up 6 months    There are no diagnoses linked to this encounter.

## 2024-08-13 DIAGNOSIS — E78.5 HYPERLIPIDEMIA, UNSPECIFIED HYPERLIPIDEMIA TYPE: ICD-10-CM

## 2024-08-14 RX ORDER — ATORVASTATIN CALCIUM 40 MG/1
40 TABLET, FILM COATED ORAL DAILY
Qty: 90 TABLET | Refills: 3 | Status: SHIPPED | OUTPATIENT
Start: 2024-08-14

## 2024-09-11 DIAGNOSIS — Z12.31 ENCOUNTER FOR SCREENING MAMMOGRAM FOR BREAST CANCER: ICD-10-CM

## 2024-10-29 ENCOUNTER — TELEPHONE (OUTPATIENT)
Dept: PRIMARY CARE | Facility: CLINIC | Age: 64
End: 2024-10-29
Payer: COMMERCIAL

## 2024-11-23 DIAGNOSIS — I10 PRIMARY HYPERTENSION: ICD-10-CM

## 2024-11-25 RX ORDER — BENAZEPRIL HYDROCHLORIDE AND HYDROCHLOROTHIAZIDE 10; 12.5 MG/1; MG/1
1 TABLET ORAL DAILY
Qty: 90 TABLET | Refills: 3 | Status: SHIPPED | OUTPATIENT
Start: 2024-11-25

## 2025-02-13 ENCOUNTER — APPOINTMENT (OUTPATIENT)
Dept: PRIMARY CARE | Facility: CLINIC | Age: 65
End: 2025-02-13
Payer: COMMERCIAL

## 2025-02-17 LAB
25(OH)D3+25(OH)D2 SERPL-MCNC: 78 NG/ML (ref 30–100)
ALBUMIN SERPL-MCNC: 4.4 G/DL (ref 3.6–5.1)
ALP SERPL-CCNC: 106 U/L (ref 37–153)
ALT SERPL-CCNC: 13 U/L (ref 6–29)
ANION GAP SERPL CALCULATED.4IONS-SCNC: 10 MMOL/L (CALC) (ref 7–17)
AST SERPL-CCNC: 13 U/L (ref 10–35)
BILIRUB SERPL-MCNC: 0.4 MG/DL (ref 0.2–1.2)
BUN SERPL-MCNC: 23 MG/DL (ref 7–25)
CALCIUM SERPL-MCNC: 9.7 MG/DL (ref 8.6–10.4)
CHLORIDE SERPL-SCNC: 106 MMOL/L (ref 98–110)
CHOLEST SERPL-MCNC: 142 MG/DL
CHOLEST/HDLC SERPL: 3.6 (CALC)
CO2 SERPL-SCNC: 24 MMOL/L (ref 20–32)
CREAT SERPL-MCNC: 0.6 MG/DL (ref 0.5–1.05)
EGFRCR SERPLBLD CKD-EPI 2021: 101 ML/MIN/1.73M2
EST. AVERAGE GLUCOSE BLD GHB EST-MCNC: 180 MG/DL
EST. AVERAGE GLUCOSE BLD GHB EST-SCNC: 10 MMOL/L
GLUCOSE SERPL-MCNC: 142 MG/DL (ref 65–99)
HBA1C MFR BLD: 7.9 % OF TOTAL HGB
HDLC SERPL-MCNC: 39 MG/DL
LDLC SERPL CALC-MCNC: 78 MG/DL (CALC)
NONHDLC SERPL-MCNC: 103 MG/DL (CALC)
POTASSIUM SERPL-SCNC: 4.3 MMOL/L (ref 3.5–5.3)
PROT SERPL-MCNC: 6.8 G/DL (ref 6.1–8.1)
SODIUM SERPL-SCNC: 140 MMOL/L (ref 135–146)
TRIGL SERPL-MCNC: 153 MG/DL
VIT B12 SERPL-MCNC: 745 PG/ML (ref 200–1100)

## 2025-02-24 ENCOUNTER — APPOINTMENT (OUTPATIENT)
Dept: PRIMARY CARE | Facility: CLINIC | Age: 65
End: 2025-02-24
Payer: COMMERCIAL

## 2025-02-24 VITALS
TEMPERATURE: 97.8 F | DIASTOLIC BLOOD PRESSURE: 70 MMHG | SYSTOLIC BLOOD PRESSURE: 124 MMHG | BODY MASS INDEX: 29.11 KG/M2 | HEIGHT: 62 IN | WEIGHT: 158.2 LBS | HEART RATE: 64 BPM

## 2025-02-24 DIAGNOSIS — F41.9 ANXIETY DISORDER, UNSPECIFIED TYPE: ICD-10-CM

## 2025-02-24 DIAGNOSIS — I10 PRIMARY HYPERTENSION: ICD-10-CM

## 2025-02-24 DIAGNOSIS — E78.5 HYPERLIPIDEMIA, UNSPECIFIED HYPERLIPIDEMIA TYPE: ICD-10-CM

## 2025-02-24 DIAGNOSIS — E11.9 TYPE 2 DIABETES MELLITUS WITHOUT COMPLICATION, WITHOUT LONG-TERM CURRENT USE OF INSULIN (MULTI): Primary | ICD-10-CM

## 2025-02-24 PROCEDURE — 99214 OFFICE O/P EST MOD 30 MIN: CPT | Performed by: FAMILY MEDICINE

## 2025-02-24 PROCEDURE — 3008F BODY MASS INDEX DOCD: CPT | Performed by: FAMILY MEDICINE

## 2025-02-24 PROCEDURE — 1036F TOBACCO NON-USER: CPT | Performed by: FAMILY MEDICINE

## 2025-02-24 PROCEDURE — 3078F DIAST BP <80 MM HG: CPT | Performed by: FAMILY MEDICINE

## 2025-02-24 PROCEDURE — 3074F SYST BP LT 130 MM HG: CPT | Performed by: FAMILY MEDICINE

## 2025-02-24 RX ORDER — EMPAGLIFLOZIN 25 MG/1
25 TABLET, FILM COATED ORAL DAILY
Qty: 90 TABLET | Refills: 1 | Status: SHIPPED | OUTPATIENT
Start: 2025-02-24 | End: 2026-02-24

## 2025-02-24 RX ORDER — BENAZEPRIL HYDROCHLORIDE AND HYDROCHLOROTHIAZIDE 10; 12.5 MG/1; MG/1
1 TABLET ORAL DAILY
Qty: 90 TABLET | Refills: 1 | Status: SHIPPED | OUTPATIENT
Start: 2025-02-24

## 2025-02-24 RX ORDER — SERTRALINE HYDROCHLORIDE 50 MG/1
50 TABLET, FILM COATED ORAL EVERY 24 HOURS
Qty: 90 TABLET | Refills: 1 | Status: SHIPPED | OUTPATIENT
Start: 2025-02-24 | End: 2026-02-24

## 2025-02-24 RX ORDER — ATORVASTATIN CALCIUM 40 MG/1
40 TABLET, FILM COATED ORAL DAILY
Qty: 90 TABLET | Refills: 1 | Status: SHIPPED | OUTPATIENT
Start: 2025-02-24

## 2025-02-24 RX ORDER — METFORMIN HYDROCHLORIDE 500 MG/1
1000 TABLET ORAL
Qty: 360 TABLET | Refills: 1 | Status: SHIPPED | OUTPATIENT
Start: 2025-02-24 | End: 2026-02-24

## 2025-02-24 ASSESSMENT — PATIENT HEALTH QUESTIONNAIRE - PHQ9
2. FEELING DOWN, DEPRESSED OR HOPELESS: NOT AT ALL
SUM OF ALL RESPONSES TO PHQ9 QUESTIONS 1 AND 2: 0
1. LITTLE INTEREST OR PLEASURE IN DOING THINGS: NOT AT ALL

## 2025-02-24 NOTE — PROGRESS NOTES
"    /70 (BP Location: Right arm, Patient Position: Sitting, BP Cuff Size: Adult)   Pulse 64   Temp 36.6 °C (97.8 °F)   Ht 1.575 m (5' 2\")   Wt 71.8 kg (158 lb 3.2 oz)   BMI 28.94 kg/m²     Past Medical History:   Diagnosis Date    Acute upper respiratory infection, unspecified 12/06/2021    Upper respiratory infection, acute    Anxiety     Body mass index (BMI)30.0-30.9, adult     Body mass index (BMI) of 30.0-30.9 in adult    Contact with and (suspected) exposure to covid-19 12/06/2021    Exposure to COVID-19 virus    COVID-19 12/09/2021    COVID    Diabetes mellitus (Multi)     Elevated blood-pressure reading, without diagnosis of hypertension     Elevated blood pressure reading without diagnosis of hypertension    Hypertension     Localized swelling, mass and lump, unspecified upper limb     Lump of axilla    Other conditions influencing health status     Type 2 diabetes mellitus, uncontrolled    Pain in right knee     Acute pain of right knee    Personal history of other benign neoplasm     History of lipoma       Patient Active Problem List   Diagnosis    Anxiety disorder    Hallux valgus    Hyperlipidemia    Lipoma    Class 1 obesity    Primary hypertension    Type 2 diabetes mellitus    Cobalamin deficiency    Vitamin D deficiency    Axillary mass    Elevated blood pressure reading without diagnosis of hypertension    History of lipoma    Knee pain       Current Outpatient Medications   Medication Sig Dispense Refill    aspirin 81 mg EC tablet Take 1 tablet (81 mg) by mouth once daily.      atorvastatin (Lipitor) 40 mg tablet TAKE 1 TABLET DAILY 90 tablet 3    benazepriL-hydrochlorothiazide (Lotensin HCT) 10-12.5 mg tablet TAKE 1 TABLET DAILY 90 tablet 3    cholecalciferol (Vitamin D-3) 5,000 Units tablet       coenzyme Q-10 100 mg capsule       cyanocobalamin (Vitamin B-12) 1,000 mcg tablet Take 1 tablet (1,000 mcg) by mouth once daily.      Jardiance 25 mg Take 1 tablet (25 mg) by mouth once " daily. 90 tablet 1    lancets misc 1 each once daily. 100 each 3    metFORMIN (Glucophage) 500 mg tablet Take 2 tablets (1,000 mg) by mouth 2 times daily (morning and late afternoon). 360 tablet 1    OneTouch Ultra Test strip once daily.      sertraline (Zoloft) 50 mg tablet Take 1 tablet (50 mg) by mouth once every 24 hours. 90 tablet 1    nystatin-triamcinolone (Mycolog II) ointment Apply 1 Application topically twice a day. (Patient not taking: Reported on 2/24/2025)       No current facility-administered medications for this visit.       CC/HPI/ASSESSMENT/PLAN    CC follow-up medicine    HPI patient with a history of diabetes hyperlipidemia hypertension anxiety disorder, patient notes overall feeling well.  Patient request refills.  Blood work form recently reviewed.  A1c increased 7.9.  Liver kidney function looks good.  Thyroid levels are good.  All results were reviewed.  Patient request refills.  She denies fever chills chest pain palpitation.  We discussed dietary changes to improve blood sugar control.    Exam calm vital stable neck supple lungs CTA CV RRR Ext no edema skin no rash neuro alert oriented no focal neurologic deficits noted.  Psych calm pleasant female no psychosis    A/P 1.  Type 2 diabetes borderline uncontrolled.  Dietary changes weight loss discussed.  Medicines are refilled.  Patient will follow-up 3 months to check A1c in office.  #2 hyperlipidemia chronic stable 3 anxiety disorder chronic stable.  Zoloft refilled.  4 hypertension chronic stable.  Medicines refilled follow-up 3 months for A1c check in office    There are no diagnoses linked to this encounter.

## 2025-06-05 ENCOUNTER — APPOINTMENT (OUTPATIENT)
Dept: PRIMARY CARE | Facility: CLINIC | Age: 65
End: 2025-06-05
Payer: COMMERCIAL

## 2025-06-05 VITALS
SYSTOLIC BLOOD PRESSURE: 124 MMHG | HEIGHT: 62 IN | HEART RATE: 67 BPM | BODY MASS INDEX: 28.89 KG/M2 | DIASTOLIC BLOOD PRESSURE: 70 MMHG | WEIGHT: 157 LBS

## 2025-06-05 DIAGNOSIS — E78.5 HYPERLIPIDEMIA, UNSPECIFIED HYPERLIPIDEMIA TYPE: ICD-10-CM

## 2025-06-05 DIAGNOSIS — E55.9 VITAMIN D DEFICIENCY: ICD-10-CM

## 2025-06-05 DIAGNOSIS — I10 PRIMARY HYPERTENSION: ICD-10-CM

## 2025-06-05 DIAGNOSIS — E11.9 TYPE 2 DIABETES MELLITUS WITHOUT COMPLICATION, WITHOUT LONG-TERM CURRENT USE OF INSULIN: Primary | ICD-10-CM

## 2025-06-05 LAB — POC HEMOGLOBIN A1C: 7.5 % (ref 4.2–6.5)

## 2025-06-05 PROCEDURE — 83036 HEMOGLOBIN GLYCOSYLATED A1C: CPT | Performed by: FAMILY MEDICINE

## 2025-06-05 PROCEDURE — 3051F HG A1C>EQUAL 7.0%<8.0%: CPT | Performed by: FAMILY MEDICINE

## 2025-06-05 PROCEDURE — 3078F DIAST BP <80 MM HG: CPT | Performed by: FAMILY MEDICINE

## 2025-06-05 PROCEDURE — 3008F BODY MASS INDEX DOCD: CPT | Performed by: FAMILY MEDICINE

## 2025-06-05 PROCEDURE — 3074F SYST BP LT 130 MM HG: CPT | Performed by: FAMILY MEDICINE

## 2025-06-05 PROCEDURE — 1036F TOBACCO NON-USER: CPT | Performed by: FAMILY MEDICINE

## 2025-06-05 PROCEDURE — 99214 OFFICE O/P EST MOD 30 MIN: CPT | Performed by: FAMILY MEDICINE

## 2025-06-05 NOTE — PROGRESS NOTES
"    /70   Pulse 67   Ht 1.575 m (5' 2\")   Wt 71.2 kg (157 lb)   BMI 28.72 kg/m²     Medical History[1]    Problem List[2]    Current Medications[3]    CC/HPI/ASSESSMENT/PLAN    CC follow medicine    HPI 58-year-old female history of diabetes hypertension anemia, patient states dietary changes, medications reviewed.  Last A1c 7.9.  Check A1c in office today is 7.5.  He denies fever chills chest pain palpitation short of breath.  Blood pressure under excellent control.  He denies side effect from medication.  Denies fever chills chest pain palpitation short of breath.  ROS negative except    Exam calm vital stable eyes no jaundice neck supple no carotid bruit lungs CTA CV RRR no edema skin no rash    A/P 1 type 2 diabetes uncontrolled improving A1c now 7.5.  Continue medications.  Blood work is ordered.  Dietary changes discussed.  Follow-up 3 months.  2 hypertension chronic stable.  #470.  3 hyperlipidemia chronic stable.  Blood work is ordered.  Medications filled.  Follow-up 3 months    There are no diagnoses linked to this encounter.          [1]   Past Medical History:  Diagnosis Date    Acute upper respiratory infection, unspecified 12/06/2021    Upper respiratory infection, acute    Anxiety     Body mass index (BMI)30.0-30.9, adult     Body mass index (BMI) of 30.0-30.9 in adult    Contact with and (suspected) exposure to covid-19 12/06/2021    Exposure to COVID-19 virus    COVID-19 12/09/2021    COVID    Diabetes mellitus (Multi)     Elevated blood-pressure reading, without diagnosis of hypertension     Elevated blood pressure reading without diagnosis of hypertension    Hypertension     Localized swelling, mass and lump, unspecified upper limb     Lump of axilla    Other conditions influencing health status     Type 2 diabetes mellitus, uncontrolled    Pain in right knee     Acute pain of right knee    Personal history of other benign neoplasm     History of lipoma   [2]   Patient Active Problem " List  Diagnosis    Anxiety disorder    Hallux valgus    Hyperlipidemia    Lipoma    Class 1 obesity    Primary hypertension    Type 2 diabetes mellitus    Cobalamin deficiency    Vitamin D deficiency    Axillary mass    Elevated blood pressure reading without diagnosis of hypertension    History of lipoma    Knee pain   [3]   Current Outpatient Medications   Medication Sig Dispense Refill    aspirin 81 mg EC tablet Take 1 tablet (81 mg) by mouth once daily.      atorvastatin (Lipitor) 40 mg tablet Take 1 tablet (40 mg) by mouth once daily. 90 tablet 1    benazepriL-hydrochlorothiazide (Lotensin HCT) 10-12.5 mg tablet Take 1 tablet by mouth once daily. 90 tablet 1    cholecalciferol (Vitamin D-3) 5,000 Units tablet       coenzyme Q-10 100 mg capsule       cyanocobalamin (Vitamin B-12) 1,000 mcg tablet Take 1 tablet (1,000 mcg) by mouth once daily.      Jardiance 25 mg Take 1 tablet (25 mg) by mouth once daily. 90 tablet 1    lancets misc 1 each once daily. 100 each 3    metFORMIN (Glucophage) 500 mg tablet Take 2 tablets (1,000 mg) by mouth 2 times daily (morning and late afternoon). 360 tablet 1    OneTouch Ultra Test strip once daily.      sertraline (Zoloft) 50 mg tablet Take 1 tablet (50 mg) by mouth once every 24 hours. 90 tablet 1    nystatin-triamcinolone (Mycolog II) ointment Apply 1 Application topically twice a day. (Patient not taking: Reported on 6/5/2025)       No current facility-administered medications for this visit.

## 2025-08-29 LAB
25(OH)D3+25(OH)D2 SERPL-MCNC: 67 NG/ML (ref 30–100)
ALBUMIN SERPL-MCNC: 4.6 G/DL (ref 3.6–5.1)
ALP SERPL-CCNC: 92 U/L (ref 37–153)
ALT SERPL-CCNC: 13 U/L (ref 6–29)
ANION GAP SERPL CALCULATED.4IONS-SCNC: 7 MMOL/L (CALC) (ref 7–17)
AST SERPL-CCNC: 11 U/L (ref 10–35)
BILIRUB SERPL-MCNC: 0.5 MG/DL (ref 0.2–1.2)
BUN SERPL-MCNC: 23 MG/DL (ref 7–25)
CALCIUM SERPL-MCNC: 9.7 MG/DL (ref 8.6–10.4)
CHLORIDE SERPL-SCNC: 106 MMOL/L (ref 98–110)
CHOLEST SERPL-MCNC: 170 MG/DL
CHOLEST/HDLC SERPL: 4 (CALC)
CO2 SERPL-SCNC: 26 MMOL/L (ref 20–32)
CREAT SERPL-MCNC: 0.63 MG/DL (ref 0.5–1.05)
EGFRCR SERPLBLD CKD-EPI 2021: 100 ML/MIN/1.73M2
EST. AVERAGE GLUCOSE BLD GHB EST-MCNC: 177 MG/DL
EST. AVERAGE GLUCOSE BLD GHB EST-SCNC: 9.8 MMOL/L
GLUCOSE SERPL-MCNC: 143 MG/DL (ref 65–99)
HBA1C MFR BLD: 7.8 %
HDLC SERPL-MCNC: 43 MG/DL
LDLC SERPL CALC-MCNC: 102 MG/DL (CALC)
NONHDLC SERPL-MCNC: 127 MG/DL (CALC)
POTASSIUM SERPL-SCNC: 4.7 MMOL/L (ref 3.5–5.3)
PROT SERPL-MCNC: 7 G/DL (ref 6.1–8.1)
SODIUM SERPL-SCNC: 139 MMOL/L (ref 135–146)
TRIGL SERPL-MCNC: 150 MG/DL
TSH SERPL-ACNC: 2.03 MIU/L (ref 0.4–4.5)

## 2025-09-05 ENCOUNTER — APPOINTMENT (OUTPATIENT)
Dept: PRIMARY CARE | Facility: CLINIC | Age: 65
End: 2025-09-05
Payer: COMMERCIAL

## 2025-09-05 ASSESSMENT — PATIENT HEALTH QUESTIONNAIRE - PHQ9
SUM OF ALL RESPONSES TO PHQ9 QUESTIONS 1 AND 2: 0
2. FEELING DOWN, DEPRESSED OR HOPELESS: NOT AT ALL
1. LITTLE INTEREST OR PLEASURE IN DOING THINGS: NOT AT ALL

## 2025-12-03 ENCOUNTER — APPOINTMENT (OUTPATIENT)
Dept: PRIMARY CARE | Facility: CLINIC | Age: 65
End: 2025-12-03
Payer: COMMERCIAL